# Patient Record
Sex: MALE | Race: WHITE
[De-identification: names, ages, dates, MRNs, and addresses within clinical notes are randomized per-mention and may not be internally consistent; named-entity substitution may affect disease eponyms.]

---

## 2017-01-06 ENCOUNTER — HOSPITAL ENCOUNTER (OUTPATIENT)
Dept: HOSPITAL 19 - COL.RAD | Age: 82
End: 2017-01-06
Attending: PSYCHIATRY & NEUROLOGY
Payer: MEDICARE

## 2017-01-06 DIAGNOSIS — G31.89: Primary | ICD-10-CM

## 2017-01-06 DIAGNOSIS — R41.3: ICD-10-CM

## 2017-01-06 DIAGNOSIS — I67.82: ICD-10-CM

## 2017-10-03 ENCOUNTER — HOSPITAL ENCOUNTER (OUTPATIENT)
Dept: HOSPITAL 19 - COL.RAD | Age: 82
End: 2017-10-03
Attending: PSYCHIATRY & NEUROLOGY
Payer: MEDICARE

## 2017-10-03 DIAGNOSIS — K44.9: Primary | ICD-10-CM

## 2019-07-19 ENCOUNTER — HOSPITAL ENCOUNTER (INPATIENT)
Dept: HOSPITAL 19 - COL.ER | Age: 84
LOS: 2 days | Discharge: NURSING, REHAB OR LONG-TERM CARE | DRG: 872 | End: 2019-07-21
Attending: FAMILY MEDICINE | Admitting: FAMILY MEDICINE
Payer: MEDICARE

## 2019-07-19 VITALS — HEIGHT: 70 IN | WEIGHT: 166.45 LBS | BODY MASS INDEX: 23.83 KG/M2

## 2019-07-19 VITALS — HEART RATE: 81 BPM | DIASTOLIC BLOOD PRESSURE: 46 MMHG | SYSTOLIC BLOOD PRESSURE: 125 MMHG | TEMPERATURE: 98.9 F

## 2019-07-19 VITALS — DIASTOLIC BLOOD PRESSURE: 57 MMHG | HEART RATE: 93 BPM | TEMPERATURE: 101.1 F | SYSTOLIC BLOOD PRESSURE: 143 MMHG

## 2019-07-19 VITALS — TEMPERATURE: 98.9 F | HEART RATE: 81 BPM | SYSTOLIC BLOOD PRESSURE: 125 MMHG | DIASTOLIC BLOOD PRESSURE: 46 MMHG

## 2019-07-19 DIAGNOSIS — F03.90: ICD-10-CM

## 2019-07-19 DIAGNOSIS — A41.9: Primary | ICD-10-CM

## 2019-07-19 DIAGNOSIS — R65.20: ICD-10-CM

## 2019-07-19 DIAGNOSIS — E78.5: ICD-10-CM

## 2019-07-19 DIAGNOSIS — G47.33: ICD-10-CM

## 2019-07-19 DIAGNOSIS — N39.0: ICD-10-CM

## 2019-07-19 DIAGNOSIS — R09.02: ICD-10-CM

## 2019-07-19 DIAGNOSIS — I10: ICD-10-CM

## 2019-07-19 DIAGNOSIS — E87.2: ICD-10-CM

## 2019-07-19 DIAGNOSIS — Z79.82: ICD-10-CM

## 2019-07-19 DIAGNOSIS — N40.0: ICD-10-CM

## 2019-07-19 DIAGNOSIS — D68.51: ICD-10-CM

## 2019-07-19 DIAGNOSIS — Z86.73: ICD-10-CM

## 2019-07-19 DIAGNOSIS — Z96.642: ICD-10-CM

## 2019-07-19 LAB
ALBUMIN SERPL-MCNC: 3.9 GM/DL (ref 3.5–5)
ALP SERPL-CCNC: 92 U/L (ref 50–136)
ALT SERPL-CCNC: 15 U/L (ref 21–72)
ANION GAP SERPL CALC-SCNC: 12 MMOL/L (ref 7–16)
AST SERPL-CCNC: 25 U/L (ref 15–37)
BASE EXCESS BLDA CALC-SCNC: 2.5 MMOL/L (ref -2–2)
BASOPHILS # BLD: 0.1 10*3/UL (ref 0–0.2)
BASOPHILS NFR BLD AUTO: 0.3 % (ref 0–2)
BILIRUB SERPL-MCNC: 0.9 MG/DL (ref 0–1)
BUN SERPL-MCNC: 19 MG/DL (ref 9–20)
CALCIUM SERPL-MCNC: 8.9 MG/DL (ref 8.4–10.2)
CHLORIDE SERPL-SCNC: 99 MMOL/L (ref 98–107)
CO2 BLDA-SCNC: 27.5 MMOL/L
CO2 SERPL-SCNC: 28 MMOL/L (ref 22–30)
CREAT SERPL-SCNC: 1.18 UMOL/L (ref 0.66–1.25)
EOSINOPHIL # BLD: 0 10*3/UL (ref 0–0.7)
EOSINOPHIL NFR BLD: 0 % (ref 0–4)
ERYTHROCYTE [DISTWIDTH] IN BLOOD BY AUTOMATED COUNT: 13 % (ref 11.5–14.5)
GLUCOSE SERPL-MCNC: 145 MG/DL (ref 74–106)
GRANULOCYTES # BLD AUTO: 90.3 % (ref 42.2–75.2)
HCO3 BLDA-SCNC: 26.4 MEQ/L (ref 22–26)
HCT VFR BLD AUTO: 31.4 % (ref 42–52)
HGB BLD-MCNC: 10 G/DL (ref 13.5–18)
INR BLD: 1.2 (ref 0.8–3)
LYMPHOCYTES # BLD: 1.4 10*3/UL (ref 1.2–3.4)
LYMPHOCYTES NFR BLD: 4.2 % (ref 20–51)
MCH RBC QN AUTO: 31 PG (ref 27–31)
MCHC RBC AUTO-ENTMCNC: 32 G/DL (ref 33–37)
MCV RBC AUTO: 98 FL (ref 80–100)
MONOCYTES # BLD: 1.4 10*3/UL (ref 0.1–0.6)
MONOCYTES NFR BLD AUTO: 4.3 % (ref 1.7–9.3)
NEUTROPHILS # BLD: 28.7 10*3/UL (ref 1.4–6.5)
PCO2 BLDA: 38 MMHG (ref 35–45)
PH UR STRIP.AUTO: 5 [PH] (ref 5–8)
PLATELET # BLD AUTO: 375 K/MM3 (ref 130–400)
PMV BLD AUTO: 11.1 FL (ref 7.4–10.4)
PO2 BLDA: 64.3 MMHG (ref 80–100)
POTASSIUM SERPL-SCNC: 4.2 MMOL/L (ref 3.4–5)
PROT SERPL-MCNC: 7.5 GM/DL (ref 6.4–8.2)
PROTHROMBIN TIME: 13.7 SECONDS (ref 9.7–12.8)
RBC # BLD AUTO: 3.22 M/MM3 (ref 4.2–5.6)
RBC # UR STRIP.AUTO: (no result) /UL
RBC # UR: >50 /HPF
SALICYLATES SERPL-MCNC: < 1 MG/DL
SAO2 % BLDA: 92.6 % (ref 92–100)
SODIUM SERPL-SCNC: 138 MMOL/L (ref 137–145)
SP GR UR STRIP.AUTO: 1.02 (ref 1–1.03)
SQUAMOUS # URNS: (no result) /HPF
TROPONIN I SERPL-MCNC: < 0.012 NG/ML (ref 0–0.04)
UA DIPSTICK PNL UR STRIP.AUTO: (no result)
URINE LEUKOCYTE ESTERASE: (no result)
URN COLLECT METHOD CLASS: (no result)
UROBILINOGEN UR STRIP.AUTO-MCNC: 2 MG/DL

## 2019-07-19 PROCEDURE — A4216 STERILE WATER/SALINE, 10 ML: HCPCS

## 2019-07-19 NOTE — NUR
PT ARRIVED FROM  ON [place] AND SON AT SIDE. DID 5 PAGE WITH SON. SON
ADVISED THAT FATHER HAD HIP REPLACEMENT ON JULY 2ND, WHEN AT REHAB AT [place],
THEY DID PERIOTIC BLOOD TEST AND HIS WBC WAS ELEVATED AND THEN IT DROPPED
DOWN. PT CURRENTLY IN [facility] FOR REHAB, BUT LIVES AT HOME WITH WIFE. PT
HAS SHORT TERM MEMORY PROBLEMS, DEMENTIA. PT USES WALKER TO WALK AND HAS BEEN
USING A WHEELCHAIR. NO C/O PAIN OR DISCOMFORT, CALL LIGHT WITHIN REACH. SON
TYREL ADVISED THAT TO CALL HIM FIRST AND SISTER SECOND, BUT SHE LIVES HERE. SO
TYREL LIVES IN [place], WILL RETURN TO  ON 7/20/19. CALL LIGHT WITHIN
REACH.

## 2019-07-19 NOTE — NUR
PT HAS INCISION TO RIGHT HIP THAT IS CLEAN, DRY, AND INTACT, WITH NO S/S OF
INFECTION, NO WARMTH, REDNESS, OR PAIN.

## 2019-07-20 VITALS — TEMPERATURE: 98 F | SYSTOLIC BLOOD PRESSURE: 126 MMHG | HEART RATE: 79 BPM | DIASTOLIC BLOOD PRESSURE: 54 MMHG

## 2019-07-20 VITALS — HEART RATE: 66 BPM | SYSTOLIC BLOOD PRESSURE: 127 MMHG | DIASTOLIC BLOOD PRESSURE: 57 MMHG | TEMPERATURE: 97.9 F

## 2019-07-20 VITALS — TEMPERATURE: 98.4 F | DIASTOLIC BLOOD PRESSURE: 57 MMHG | HEART RATE: 81 BPM | SYSTOLIC BLOOD PRESSURE: 131 MMHG

## 2019-07-20 VITALS — SYSTOLIC BLOOD PRESSURE: 133 MMHG | HEART RATE: 90 BPM | TEMPERATURE: 98.6 F | DIASTOLIC BLOOD PRESSURE: 45 MMHG

## 2019-07-20 VITALS — DIASTOLIC BLOOD PRESSURE: 62 MMHG | SYSTOLIC BLOOD PRESSURE: 154 MMHG | HEART RATE: 94 BPM | TEMPERATURE: 100 F

## 2019-07-20 LAB
ALBUMIN SERPL-MCNC: 3 GM/DL (ref 3.5–5)
ALP SERPL-CCNC: 81 U/L (ref 50–136)
ALT SERPL-CCNC: 18 U/L (ref 21–72)
ANION GAP SERPL CALC-SCNC: 10 MMOL/L (ref 7–16)
ANISOCYTOSIS BLD QL: (no result)
AST SERPL-CCNC: 18 U/L (ref 15–37)
BILIRUB SERPL-MCNC: 0.7 MG/DL (ref 0–1)
BUN SERPL-MCNC: 18 MG/DL (ref 9–20)
CALCIUM SERPL-MCNC: 7.9 MG/DL (ref 8.4–10.2)
CHLORIDE SERPL-SCNC: 105 MMOL/L (ref 98–107)
CO2 SERPL-SCNC: 25 MMOL/L (ref 22–30)
CREAT SERPL-SCNC: 0.84 UMOL/L (ref 0.66–1.25)
ERYTHROCYTE [DISTWIDTH] IN BLOOD BY AUTOMATED COUNT: 13.1 % (ref 11.5–14.5)
GLUCOSE SERPL-MCNC: 124 MG/DL (ref 74–106)
HCT VFR BLD AUTO: 26.4 % (ref 42–52)
HGB BLD-MCNC: 8.4 G/DL (ref 13.5–18)
HYPOCHROMIA BLD QL SMEAR: (no result)
LYMPHOCYTES NFR BLD MANUAL: 6 % (ref 20–51)
MCH RBC QN AUTO: 31 PG (ref 27–31)
MCHC RBC AUTO-ENTMCNC: 32 G/DL (ref 33–37)
MCV RBC AUTO: 98 FL (ref 80–100)
MONOCYTES NFR BLD: 3 % (ref 1.7–9.3)
NEUTS BAND NFR BLD: 4 % (ref 0–10)
NEUTS SEG NFR BLD MANUAL: 87 % (ref 42–75.2)
PLATELET # BLD AUTO: 275 K/MM3 (ref 130–400)
PLATELET BLD QL SMEAR: NORMAL
PMV BLD AUTO: 10.6 FL (ref 7.4–10.4)
POTASSIUM SERPL-SCNC: 4 MMOL/L (ref 3.4–5)
PROT SERPL-MCNC: 6.3 GM/DL (ref 6.4–8.2)
RBC # BLD AUTO: 2.7 M/MM3 (ref 4.2–5.6)
SODIUM SERPL-SCNC: 139 MMOL/L (ref 137–145)
TOXIC GRANULES BLD QL SMEAR: PRESENT

## 2019-07-20 NOTE — NUR
Patient assisted to bedside commode x2 assist and assisted to bed. Assessment
complete. Lungs clear. Murmur heard with auscultation of heart sounds. Bowels
active x4. Pulses strong throughout. No edema noted. Denies pain at this time.
Incontinent of urine and bowel. Cares provided. Denies other needs. NS at
150ml/hr infusing into right forearm without complications. Left AC INT
without complications. Patient alert but confused on plac, time, and
situation. Orientated patient. Bed alarm in place.

## 2019-07-20 NOTE — NUR
Pt has been resting in his room, spent several hours in the recliner today,
has been up and using the bedside commode with assistance with some
incontinence, VS have remained stable.

## 2019-07-20 NOTE — NUR
PT IN BED WITH HOB AT 15 DEGREE ANGLE, AND PT WEARING CPAP. PT HAS BEEN
INCONTINENT OF URINE AND HAS BEEN CHANGED A COUPLE OF TIMES. PT A/O TO SELF.
PT IS PLEASANT AND COOPERATIVE. PT DENIES PAIN OR DISCOMFORT. PT NOT ABLE TO
MOVE LEFT LEG, BUT PT DID HAVE HIP SURGERY THAT HE IS RECOVERING FROM AND
STILL HAS A DRSG OVER LEFT HIP SITE THAT IS C/D/I, AND THERE IS NO S/S OF
INFECTION NOTED. PT DID EAT A SNACK BEFORE CPAP PLACED ON HIM. PT REQUESTED
STRAWBERRY ICE CREAM. PT RESTING WELL AND DOES EASILY AWAKEN AND GOES BACK TO
SLEEP. CALL LIGHT IN REACH AND BED ALARM ON. PT WAS MOVED TO ROOM 313 TO KEEP
HIM CLOSE TO NURSES STATION.

## 2019-07-20 NOTE — NUR
Pt napping upon entry, easily awakened, no C/O pain at this time, shift
assessments complete, left Pt call light in reach, bed in lowesyt position.

## 2019-07-21 VITALS — SYSTOLIC BLOOD PRESSURE: 124 MMHG | DIASTOLIC BLOOD PRESSURE: 63 MMHG | HEART RATE: 76 BPM | TEMPERATURE: 99.1 F

## 2019-07-21 VITALS — TEMPERATURE: 98.9 F | HEART RATE: 66 BPM | DIASTOLIC BLOOD PRESSURE: 63 MMHG | SYSTOLIC BLOOD PRESSURE: 145 MMHG

## 2019-07-21 VITALS — SYSTOLIC BLOOD PRESSURE: 118 MMHG | TEMPERATURE: 98.6 F | HEART RATE: 94 BPM | DIASTOLIC BLOOD PRESSURE: 62 MMHG

## 2019-07-21 VITALS — DIASTOLIC BLOOD PRESSURE: 64 MMHG | HEART RATE: 77 BPM | TEMPERATURE: 98.8 F | SYSTOLIC BLOOD PRESSURE: 144 MMHG

## 2019-07-21 VITALS — TEMPERATURE: 98.6 F | HEART RATE: 94 BPM | DIASTOLIC BLOOD PRESSURE: 62 MMHG | SYSTOLIC BLOOD PRESSURE: 118 MMHG

## 2019-07-21 LAB
ALBUMIN SERPL-MCNC: 2.9 GM/DL (ref 3.5–5)
ALP SERPL-CCNC: 79 U/L (ref 50–136)
ALT SERPL-CCNC: 20 U/L (ref 21–72)
ANION GAP SERPL CALC-SCNC: 9 MMOL/L (ref 7–16)
AST SERPL-CCNC: 25 U/L (ref 15–37)
BASOPHILS # BLD: 0 10*3/UL (ref 0–0.2)
BASOPHILS NFR BLD AUTO: 0.3 % (ref 0–2)
BILIRUB SERPL-MCNC: 0.6 MG/DL (ref 0–1)
BUN SERPL-MCNC: 16 MG/DL (ref 9–20)
CALCIUM SERPL-MCNC: 8.2 MG/DL (ref 8.4–10.2)
CHLORIDE SERPL-SCNC: 106 MMOL/L (ref 98–107)
CO2 SERPL-SCNC: 23 MMOL/L (ref 22–30)
CREAT SERPL-SCNC: 0.78 UMOL/L (ref 0.66–1.25)
EOSINOPHIL # BLD: 0.1 10*3/UL (ref 0–0.7)
EOSINOPHIL NFR BLD: 0.8 % (ref 0–4)
ERYTHROCYTE [DISTWIDTH] IN BLOOD BY AUTOMATED COUNT: 13.1 % (ref 11.5–14.5)
GLUCOSE SERPL-MCNC: 108 MG/DL (ref 74–106)
GRANULOCYTES # BLD AUTO: 86 % (ref 42.2–75.2)
HCT VFR BLD AUTO: 32.3 % (ref 42–52)
HGB BLD-MCNC: 10.3 G/DL (ref 13.5–18)
LYMPHOCYTES # BLD: 0.8 10*3/UL (ref 1.2–3.4)
LYMPHOCYTES NFR BLD: 7 % (ref 20–51)
MCH RBC QN AUTO: 31 PG (ref 27–31)
MCHC RBC AUTO-ENTMCNC: 32 G/DL (ref 33–37)
MCV RBC AUTO: 97 FL (ref 80–100)
MONOCYTES # BLD: 0.6 10*3/UL (ref 0.1–0.6)
MONOCYTES NFR BLD AUTO: 5.5 % (ref 1.7–9.3)
NEUTROPHILS # BLD: 9.8 10*3/UL (ref 1.4–6.5)
PLATELET # BLD AUTO: 200 K/MM3 (ref 130–400)
PMV BLD AUTO: 11 FL (ref 7.4–10.4)
POTASSIUM SERPL-SCNC: 3.9 MMOL/L (ref 3.4–5)
PROT SERPL-MCNC: 6.1 GM/DL (ref 6.4–8.2)
RBC # BLD AUTO: 3.32 M/MM3 (ref 4.2–5.6)
SODIUM SERPL-SCNC: 138 MMOL/L (ref 137–145)

## 2019-07-21 NOTE — NUR
Pt napping upon entry, easily awakened, no C/O pain at this time, shift
assessments complete, left Pt call light in reach, bed in lowest position, bed
alarm on.

## 2019-07-21 NOTE — NUR
Pt napping upon entry, easily awakened, no C/O pain at this time, audible
wheezing noted, shift assessments complete, left Pt call light in reach, bed
in lowest position.

## 2019-07-21 NOTE — NUR
Patient confused at beginning of night. Incontinent throughout night. Cares
provided each time. Otherwise uneventful night. Resting in bed this AM
watching television.

## 2019-07-21 NOTE — NUR
Plan: Plans to return to [facility] for SNF.
Assess: SW met with patient in room, wife, and DTR Ml Bashir [phone].
Patient reports DPOA is Wife Kaylie [phone], Daughter Ml,a nd Son [phone]
[phone].Patient indicated that they recieve medications from [facility], pcp
is Dr. Francois. Patient reports that use of walker 24/7. Patient indicated
that his family is his care support at home.
Action: SW facilitated transfer to [facility]. Transport set at 02:30 pm. Faxed DC to
[facility]. Nothing follows.

## 2022-01-09 ENCOUNTER — HOSPITAL ENCOUNTER (INPATIENT)
Dept: HOSPITAL 19 - COL.ER | Age: 87
LOS: 4 days | Discharge: NURSING, REHAB OR LONG-TERM CARE | DRG: 689 | End: 2022-01-13
Attending: INTERNAL MEDICINE | Admitting: INTERNAL MEDICINE
Payer: MEDICARE

## 2022-01-09 VITALS — WEIGHT: 172.62 LBS | BODY MASS INDEX: 24.71 KG/M2 | HEIGHT: 70 IN

## 2022-01-09 VITALS — DIASTOLIC BLOOD PRESSURE: 72 MMHG | HEART RATE: 69 BPM | TEMPERATURE: 98.8 F | SYSTOLIC BLOOD PRESSURE: 181 MMHG

## 2022-01-09 VITALS — TEMPERATURE: 99.3 F | DIASTOLIC BLOOD PRESSURE: 47 MMHG | SYSTOLIC BLOOD PRESSURE: 116 MMHG | HEART RATE: 86 BPM

## 2022-01-09 DIAGNOSIS — B96.89: ICD-10-CM

## 2022-01-09 DIAGNOSIS — D72.829: ICD-10-CM

## 2022-01-09 DIAGNOSIS — I10: ICD-10-CM

## 2022-01-09 DIAGNOSIS — Z23: ICD-10-CM

## 2022-01-09 DIAGNOSIS — N40.0: ICD-10-CM

## 2022-01-09 DIAGNOSIS — Z79.82: ICD-10-CM

## 2022-01-09 DIAGNOSIS — E87.0: ICD-10-CM

## 2022-01-09 DIAGNOSIS — Z86.73: ICD-10-CM

## 2022-01-09 DIAGNOSIS — G47.33: ICD-10-CM

## 2022-01-09 DIAGNOSIS — F03.90: ICD-10-CM

## 2022-01-09 DIAGNOSIS — Z66: ICD-10-CM

## 2022-01-09 DIAGNOSIS — E87.6: ICD-10-CM

## 2022-01-09 DIAGNOSIS — D64.9: ICD-10-CM

## 2022-01-09 DIAGNOSIS — R65.10: ICD-10-CM

## 2022-01-09 DIAGNOSIS — Z96.642: ICD-10-CM

## 2022-01-09 DIAGNOSIS — E78.5: ICD-10-CM

## 2022-01-09 DIAGNOSIS — N39.0: Primary | ICD-10-CM

## 2022-01-09 DIAGNOSIS — J18.9: ICD-10-CM

## 2022-01-09 DIAGNOSIS — D68.51: ICD-10-CM

## 2022-01-09 DIAGNOSIS — Z20.822: ICD-10-CM

## 2022-01-09 DIAGNOSIS — F32.A: ICD-10-CM

## 2022-01-09 LAB
ALBUMIN SERPL-MCNC: 3.2 GM/DL (ref 3.4–4.8)
ALP SERPL-CCNC: 88 U/L (ref 40–150)
ALT SERPL-CCNC: 42 U/L (ref 0–55)
ANION GAP SERPL CALC-SCNC: 12 MMOL/L (ref 7–16)
AST SERPL-CCNC: 43 U/L (ref 5–34)
BASOPHILS # BLD: 0.1 K/MM3 (ref 0–0.2)
BASOPHILS NFR BLD AUTO: 0.4 % (ref 0–2)
BILIRUB SERPL-MCNC: 0.7 MG/DL (ref 0.2–1.2)
BUN SERPL-MCNC: 24 MG/DL (ref 8–26)
CALCIUM SERPL-MCNC: 9.5 MG/DL (ref 8.4–10.2)
CHLORIDE SERPL-SCNC: 119 MMOL/L (ref 98–107)
CO2 SERPL-SCNC: 26 MMOL/L (ref 23–31)
CREAT SERPL-SCNC: 1.13 MG/DL (ref 0.72–1.25)
EOSINOPHIL # BLD: 0.1 K/MM3 (ref 0–0.7)
EOSINOPHIL NFR BLD: 0.5 % (ref 0–4)
ERYTHROCYTE [DISTWIDTH] IN BLOOD BY AUTOMATED COUNT: 13.2 % (ref 11.5–14.5)
GLUCOSE SERPL-MCNC: 120 MG/DL (ref 70–99)
GRANULOCYTES # BLD AUTO: 82.5 % (ref 42.2–75.2)
HCT VFR BLD AUTO: 36.6 % (ref 42–52)
HGB BLD-MCNC: 11.7 G/DL (ref 13.5–18)
LYMPHOCYTES # BLD: 1.8 K/MM3 (ref 1.2–3.4)
LYMPHOCYTES NFR BLD: 9 % (ref 20–51)
MCH RBC QN AUTO: 31 PG (ref 27–31)
MCHC RBC AUTO-ENTMCNC: 32 G/DL (ref 33–37)
MCV RBC AUTO: 96 FL (ref 80–100)
MONOCYTES # BLD: 1.4 K/MM3 (ref 0.1–0.6)
MONOCYTES NFR BLD AUTO: 7.1 % (ref 1.7–9.3)
NEUTROPHILS # BLD: 16.3 K/MM3 (ref 1.4–6.5)
PH UR STRIP.AUTO: 5 [PH] (ref 5–8)
PLATELET # BLD AUTO: 256 K/MM3 (ref 130–400)
PMV BLD AUTO: 11.9 FL (ref 7.4–10.4)
POTASSIUM SERPL-SCNC: 3.7 MMOL/L (ref 3.5–4.5)
PROT SERPL-MCNC: 7.7 GM/DL (ref 6.2–8.1)
RBC # BLD AUTO: 3.82 M/MM3 (ref 4.2–5.6)
RBC # UR STRIP.AUTO: (no result) /UL
RBC # UR: (no result) /HPF (ref 0–2)
SODIUM SERPL-SCNC: 157 MMOL/L (ref 136–145)
SP GR UR STRIP.AUTO: 1.02 (ref 1–1.03)
UA DIPSTICK PNL UR STRIP.AUTO: (no result)
URN COLLECT METHOD CLASS: (no result)
UROBILINOGEN UR STRIP.AUTO-MCNC: 2 MG/DL

## 2022-01-10 VITALS — HEART RATE: 70 BPM | DIASTOLIC BLOOD PRESSURE: 66 MMHG | SYSTOLIC BLOOD PRESSURE: 155 MMHG

## 2022-01-10 VITALS — TEMPERATURE: 98.4 F | DIASTOLIC BLOOD PRESSURE: 64 MMHG | HEART RATE: 62 BPM | SYSTOLIC BLOOD PRESSURE: 164 MMHG

## 2022-01-10 VITALS — HEART RATE: 69 BPM | DIASTOLIC BLOOD PRESSURE: 78 MMHG | SYSTOLIC BLOOD PRESSURE: 171 MMHG | TEMPERATURE: 98 F

## 2022-01-10 VITALS — TEMPERATURE: 97.6 F | DIASTOLIC BLOOD PRESSURE: 69 MMHG | HEART RATE: 64 BPM | SYSTOLIC BLOOD PRESSURE: 170 MMHG

## 2022-01-10 VITALS — SYSTOLIC BLOOD PRESSURE: 126 MMHG | DIASTOLIC BLOOD PRESSURE: 89 MMHG | HEART RATE: 65 BPM | TEMPERATURE: 97.8 F

## 2022-01-10 VITALS — SYSTOLIC BLOOD PRESSURE: 176 MMHG | TEMPERATURE: 97.8 F | HEART RATE: 64 BPM | DIASTOLIC BLOOD PRESSURE: 73 MMHG

## 2022-01-10 LAB
ANION GAP SERPL CALC-SCNC: 11 MMOL/L (ref 7–16)
ANION GAP SERPL CALC-SCNC: 8 MMOL/L (ref 7–16)
ANION GAP SERPL CALC-SCNC: 8 MMOL/L (ref 7–16)
BASOPHILS # BLD: 0.1 K/MM3 (ref 0–0.2)
BASOPHILS NFR BLD AUTO: 0.5 % (ref 0–2)
BUN SERPL-MCNC: 19 MG/DL (ref 8–26)
BUN SERPL-MCNC: 20 MG/DL (ref 8–26)
BUN SERPL-MCNC: 25 MG/DL (ref 8–26)
CALCIUM SERPL-MCNC: 8.3 MG/DL (ref 8.4–10.2)
CALCIUM SERPL-MCNC: 8.5 MG/DL (ref 8.4–10.2)
CALCIUM SERPL-MCNC: 8.5 MG/DL (ref 8.4–10.2)
CHLORIDE SERPL-SCNC: 113 MMOL/L (ref 98–107)
CHLORIDE SERPL-SCNC: 117 MMOL/L (ref 98–107)
CHLORIDE SERPL-SCNC: 118 MMOL/L (ref 98–107)
CO2 SERPL-SCNC: 24 MMOL/L (ref 23–31)
CO2 SERPL-SCNC: 26 MMOL/L (ref 23–31)
CO2 SERPL-SCNC: 27 MMOL/L (ref 23–31)
CREAT SERPL-SCNC: 0.84 MG/DL (ref 0.72–1.25)
CREAT SERPL-SCNC: 0.85 MG/DL (ref 0.72–1.25)
CREAT SERPL-SCNC: 0.86 MG/DL (ref 0.72–1.25)
EOSINOPHIL # BLD: 0.2 K/MM3 (ref 0–0.7)
EOSINOPHIL NFR BLD: 1.2 % (ref 0–4)
ERYTHROCYTE [DISTWIDTH] IN BLOOD BY AUTOMATED COUNT: 13.2 % (ref 11.5–14.5)
GLUCOSE SERPL-MCNC: 120 MG/DL (ref 70–99)
GLUCOSE SERPL-MCNC: 131 MG/DL (ref 70–99)
GLUCOSE SERPL-MCNC: 131 MG/DL (ref 70–99)
GRANULOCYTES # BLD AUTO: 83.7 % (ref 42.2–75.2)
HCT VFR BLD AUTO: 32.7 % (ref 42–52)
HGB BLD-MCNC: 10.3 G/DL (ref 13.5–18)
LYMPHOCYTES # BLD: 1.4 K/MM3 (ref 1.2–3.4)
LYMPHOCYTES NFR BLD: 8.3 % (ref 20–51)
MAGNESIUM SERPL-MCNC: 2.3 MG/DL (ref 1.6–2.6)
MCH RBC QN AUTO: 30 PG (ref 27–31)
MCHC RBC AUTO-ENTMCNC: 32 G/DL (ref 33–37)
MCV RBC AUTO: 96 FL (ref 80–100)
MONOCYTES # BLD: 0.9 K/MM3 (ref 0.1–0.6)
MONOCYTES NFR BLD AUTO: 5.8 % (ref 1.7–9.3)
NEUTROPHILS # BLD: 13.7 K/MM3 (ref 1.4–6.5)
PLATELET # BLD AUTO: 223 K/MM3 (ref 130–400)
PMV BLD AUTO: 12.5 FL (ref 7.4–10.4)
POTASSIUM SERPL-SCNC: 3.3 MMOL/L (ref 3.5–4.5)
POTASSIUM SERPL-SCNC: 3.4 MMOL/L (ref 3.5–4.5)
POTASSIUM SERPL-SCNC: 3.7 MMOL/L (ref 3.5–4.5)
RBC # BLD AUTO: 3.41 M/MM3 (ref 4.2–5.6)
SODIUM SERPL-SCNC: 148 MMOL/L (ref 136–145)
SODIUM SERPL-SCNC: 151 MMOL/L (ref 136–145)
SODIUM SERPL-SCNC: 153 MMOL/L (ref 136–145)

## 2022-01-10 NOTE — NUR
Patient had an ok day. Scheduled medications given. Shift assessment
performed. BP elevated, medication administered. All other vital signs stable.
Patient Alert but not oriented. Mechanical soft diet initiated by ST. No s/s
of pain or discomfort at this time. Call light in reach. Fall percautions in
place.

## 2022-01-10 NOTE — NUR
The patient has dementia. JANICE contacted the patient's wife, Kaylie
(ph#[phone]), to discuss discharge plan. Kaylie resides at [facility]. The
patient resides at [facility] in [facility]. His PCP is Dr. Lv Francois. The patient does not have a DPOA-HC in EMR, but Kaylie reports
that the patient probably does have a DPOA-HC. The patient's son, Keyon
(ph#[phone]), is also listed as a person to contact. Kaylie states that
Keyon lives in [place] and that the plan is for the patient to return back
to [facility] upon discharge. JANICE contacted and faxed updates to Madonna at [facility] and
requested a copy of the patient's DPOA-HC. JANICE also attempted to contact Sangeetha
with Dr. Francois's office to inquire if they have a copy. JANICE left her a
voicemail. SW to continue to follow.
 
*Discharge plan: [facility]*

## 2022-01-10 NOTE — NUR
PT ARRIVED TO MEDICAL UNIT AROUND 2100HRS TO . PT HAS DEMENTIA AND IS
MOSTLY NONVERBAL. PT A&O TO SELF. VSS, O2 RA. ADMISSIONS ASSESSMENT AS
COMPLETE AS I CAN, DUE TO PT'S DEMENTIA AND VERBAL ABILITIES. I DID SPEAK WITH
PT'S SON AND THE NIGHT RN AT [facility] TO GET WHAT ANSWERS I COULD FROM THEM.
PT SEEMED TO BE COMFORTABLE. PT UNABLE TO ANSWER QUESTIONS CONCERNING N/V,
SOB, CHEST PAIN, ETC. ATTEMPTED TO ORIENT PT TO ROOM AND CALL LIGHT. I AM
UNABLE TO TELL IF HE UNDERSTANDS. WILL CONTINUE TO LOOK IN ON PT. CALL LIGHT
WITHIN REACH

## 2022-01-10 NOTE — NUR
SW received the patient's DPOA-HC, via fax. SW placed the document in the
patient's chart. The patient's DPOA-HC is his wife. The alternates are Joni Azar and Ml Bashir.

## 2022-01-11 VITALS — DIASTOLIC BLOOD PRESSURE: 68 MMHG | SYSTOLIC BLOOD PRESSURE: 125 MMHG | TEMPERATURE: 98.2 F | HEART RATE: 74 BPM

## 2022-01-11 VITALS — TEMPERATURE: 98.6 F | DIASTOLIC BLOOD PRESSURE: 48 MMHG | HEART RATE: 70 BPM | SYSTOLIC BLOOD PRESSURE: 178 MMHG

## 2022-01-11 VITALS — DIASTOLIC BLOOD PRESSURE: 81 MMHG | SYSTOLIC BLOOD PRESSURE: 146 MMHG | HEART RATE: 78 BPM | TEMPERATURE: 98.8 F

## 2022-01-11 VITALS — TEMPERATURE: 99 F | SYSTOLIC BLOOD PRESSURE: 129 MMHG | HEART RATE: 76 BPM | DIASTOLIC BLOOD PRESSURE: 57 MMHG

## 2022-01-11 VITALS — HEART RATE: 70 BPM | TEMPERATURE: 99.5 F | DIASTOLIC BLOOD PRESSURE: 69 MMHG | SYSTOLIC BLOOD PRESSURE: 147 MMHG

## 2022-01-11 VITALS — HEART RATE: 75 BPM | DIASTOLIC BLOOD PRESSURE: 74 MMHG | TEMPERATURE: 98.9 F | SYSTOLIC BLOOD PRESSURE: 161 MMHG

## 2022-01-11 LAB
ANION GAP SERPL CALC-SCNC: 11 MMOL/L (ref 7–16)
ANION GAP SERPL CALC-SCNC: 12 MMOL/L (ref 7–16)
BASOPHILS # BLD: 0.1 K/MM3 (ref 0–0.2)
BASOPHILS NFR BLD AUTO: 0.5 % (ref 0–2)
BUN SERPL-MCNC: 18 MG/DL (ref 8–26)
BUN SERPL-MCNC: 19 MG/DL (ref 8–26)
CALCIUM SERPL-MCNC: 8.4 MG/DL (ref 8.4–10.2)
CALCIUM SERPL-MCNC: 8.4 MG/DL (ref 8.4–10.2)
CHLORIDE SERPL-SCNC: 111 MMOL/L (ref 98–107)
CHLORIDE SERPL-SCNC: 113 MMOL/L (ref 98–107)
CO2 SERPL-SCNC: 22 MMOL/L (ref 23–31)
CO2 SERPL-SCNC: 23 MMOL/L (ref 23–31)
CREAT SERPL-SCNC: 0.85 MG/DL (ref 0.72–1.25)
CREAT SERPL-SCNC: 0.95 MG/DL (ref 0.72–1.25)
EOSINOPHIL # BLD: 0.3 K/MM3 (ref 0–0.7)
EOSINOPHIL NFR BLD: 2.2 % (ref 0–4)
ERYTHROCYTE [DISTWIDTH] IN BLOOD BY AUTOMATED COUNT: 13.2 % (ref 11.5–14.5)
GLUCOSE SERPL-MCNC: 118 MG/DL (ref 70–99)
GLUCOSE SERPL-MCNC: 124 MG/DL (ref 70–99)
GRANULOCYTES # BLD AUTO: 73.9 % (ref 42.2–75.2)
HCT VFR BLD AUTO: 32.9 % (ref 42–52)
HGB BLD-MCNC: 10.1 G/DL (ref 13.5–18)
LYMPHOCYTES # BLD: 2.3 K/MM3 (ref 1.2–3.4)
LYMPHOCYTES NFR BLD: 17.5 % (ref 20–51)
MCH RBC QN AUTO: 31 PG (ref 27–31)
MCHC RBC AUTO-ENTMCNC: 31 G/DL (ref 33–37)
MCV RBC AUTO: 100 FL (ref 80–100)
MONOCYTES # BLD: 0.7 K/MM3 (ref 0.1–0.6)
MONOCYTES NFR BLD AUTO: 5.4 % (ref 1.7–9.3)
NEUTROPHILS # BLD: 9.8 K/MM3 (ref 1.4–6.5)
PLATELET # BLD AUTO: 229 K/MM3 (ref 130–400)
PMV BLD AUTO: 13 FL (ref 7.4–10.4)
POTASSIUM SERPL-SCNC: 3.3 MMOL/L (ref 3.5–4.5)
POTASSIUM SERPL-SCNC: 3.4 MMOL/L (ref 3.5–4.5)
RBC # BLD AUTO: 3.3 M/MM3 (ref 4.2–5.6)
SODIUM SERPL-SCNC: 145 MMOL/L (ref 136–145)
SODIUM SERPL-SCNC: 147 MMOL/L (ref 136–145)

## 2022-01-11 NOTE — NUR
Patient is sitting in bed, alert but not oriented. Has been presenting HTN.
PRN provided. Assessment completed, medications provided. Able to take
medications PO with thick liquids. No further needs at this time. Call light
within reach. Bed alarm on.

## 2022-01-11 NOTE — NUR
ASSESSMENT COMPLETE FOR THIS SHIFT. PT RESTING IN BED WATCHING TV. PT'S
DAUGHTER CALL AND TALKED TO PT. ALTHOUGH, PT IS MOSTLY NONVERBAL, PT (I
BELIEVE OUT OF HABIT), SAID TO HIS DAUGHTER, "HOW'S MY FAVORITE DAUGHTER!"
AT THE BEGINNING OF THE CALL AND "I LOVE YOU, TOO!" AT THE END OF THE CALL.
HE DIDN'T REALLY SAY ANYTHING IN THE MIDDLE OF THE CALL. I WILL CONTINUE TO
CHECK OF PT OFTEN TO ENSURE HIS NEEDS ARE MET. CALL LIGHT WITHIN REACH.

## 2022-01-12 VITALS — HEART RATE: 69 BPM | TEMPERATURE: 98.4 F | SYSTOLIC BLOOD PRESSURE: 147 MMHG | DIASTOLIC BLOOD PRESSURE: 60 MMHG

## 2022-01-12 VITALS — DIASTOLIC BLOOD PRESSURE: 66 MMHG | HEART RATE: 76 BPM | TEMPERATURE: 98.4 F | SYSTOLIC BLOOD PRESSURE: 141 MMHG

## 2022-01-12 VITALS — SYSTOLIC BLOOD PRESSURE: 136 MMHG | HEART RATE: 66 BPM | TEMPERATURE: 98.4 F | DIASTOLIC BLOOD PRESSURE: 96 MMHG

## 2022-01-12 VITALS — HEART RATE: 75 BPM | DIASTOLIC BLOOD PRESSURE: 70 MMHG | SYSTOLIC BLOOD PRESSURE: 147 MMHG | TEMPERATURE: 99.1 F

## 2022-01-12 VITALS — HEART RATE: 68 BPM | SYSTOLIC BLOOD PRESSURE: 127 MMHG | TEMPERATURE: 98 F | DIASTOLIC BLOOD PRESSURE: 60 MMHG

## 2022-01-12 LAB
ANION GAP SERPL CALC-SCNC: 11 MMOL/L (ref 7–16)
BASOPHILS # BLD: 0.1 K/MM3 (ref 0–0.2)
BASOPHILS NFR BLD AUTO: 0.5 % (ref 0–2)
BUN SERPL-MCNC: 17 MG/DL (ref 8–26)
CALCIUM SERPL-MCNC: 8.5 MG/DL (ref 8.4–10.2)
CHLORIDE SERPL-SCNC: 112 MMOL/L (ref 98–107)
CO2 SERPL-SCNC: 23 MMOL/L (ref 23–31)
CREAT SERPL-SCNC: 0.87 MG/DL (ref 0.72–1.25)
EOSINOPHIL # BLD: 0.3 K/MM3 (ref 0–0.7)
EOSINOPHIL NFR BLD: 2.9 % (ref 0–4)
ERYTHROCYTE [DISTWIDTH] IN BLOOD BY AUTOMATED COUNT: 13 % (ref 11.5–14.5)
GLUCOSE SERPL-MCNC: 106 MG/DL (ref 70–99)
GRANULOCYTES # BLD AUTO: 70.4 % (ref 42.2–75.2)
HCT VFR BLD AUTO: 32.8 % (ref 42–52)
HGB BLD-MCNC: 10.2 G/DL (ref 13.5–18)
LYMPHOCYTES # BLD: 2.4 K/MM3 (ref 1.2–3.4)
LYMPHOCYTES NFR BLD: 20.4 % (ref 20–51)
MCH RBC QN AUTO: 30 PG (ref 27–31)
MCHC RBC AUTO-ENTMCNC: 31 G/DL (ref 33–37)
MCV RBC AUTO: 97 FL (ref 80–100)
MONOCYTES # BLD: 0.6 K/MM3 (ref 0.1–0.6)
MONOCYTES NFR BLD AUTO: 5.2 % (ref 1.7–9.3)
NEUTROPHILS # BLD: 8.4 K/MM3 (ref 1.4–6.5)
PLATELET # BLD AUTO: 245 K/MM3 (ref 130–400)
PMV BLD AUTO: 12.8 FL (ref 7.4–10.4)
POTASSIUM SERPL-SCNC: 3.4 MMOL/L (ref 3.5–4.5)
RBC # BLD AUTO: 3.38 M/MM3 (ref 4.2–5.6)
SODIUM SERPL-SCNC: 146 MMOL/L (ref 136–145)

## 2022-01-12 NOTE — NUR
Patient is resting in bed, alert but not oriented. He answered with his hands
that he is doing ok. It is difficult to give the medications. He closes his
mouth and refuses to take water. In the end he took them. Assessment
completed. No further needs at this time. Call light within reach, bed alarm
on.

## 2022-01-12 NOTE — NUR
PT has had a calm night. Episodes of HBP. One dose of hydralazine given. All
needs met. Report will be given to shimon GODINEZ.

## 2022-01-12 NOTE — NUR
The patient may be able to discharge tomorrow, 1/12. JANICE notified and faxed
updates to Madonna at [facility]. JANICE contacted and updated the patient's wife, Kaylie.
Pat is in agreement to the plan.
 
*Discharge plan: [facility]*

## 2022-01-12 NOTE — NUR
Shift assessment complete. Pt alert, not oriented. Minimal verbal response.
Coughing with sips of water. Hospitalist notified and re-evaluation by ST
ordered. Lungs CTA. Heart RRR. Bed alarm on and call light in reach.

## 2022-01-13 VITALS — TEMPERATURE: 97.7 F | SYSTOLIC BLOOD PRESSURE: 136 MMHG | DIASTOLIC BLOOD PRESSURE: 64 MMHG | HEART RATE: 59 BPM

## 2022-01-13 VITALS — HEART RATE: 68 BPM | DIASTOLIC BLOOD PRESSURE: 70 MMHG | SYSTOLIC BLOOD PRESSURE: 144 MMHG | TEMPERATURE: 98.3 F

## 2022-01-13 VITALS — HEART RATE: 60 BPM | SYSTOLIC BLOOD PRESSURE: 152 MMHG | DIASTOLIC BLOOD PRESSURE: 68 MMHG | TEMPERATURE: 96.9 F

## 2022-01-13 VITALS — DIASTOLIC BLOOD PRESSURE: 70 MMHG | HEART RATE: 61 BPM | SYSTOLIC BLOOD PRESSURE: 153 MMHG | TEMPERATURE: 98 F

## 2022-01-13 LAB
ANION GAP SERPL CALC-SCNC: 11 MMOL/L (ref 7–16)
BASOPHILS # BLD: 0.1 K/MM3 (ref 0–0.2)
BASOPHILS NFR BLD AUTO: 0.6 % (ref 0–2)
BUN SERPL-MCNC: 19 MG/DL (ref 8–26)
CALCIUM SERPL-MCNC: 8.5 MG/DL (ref 8.4–10.2)
CHLORIDE SERPL-SCNC: 112 MMOL/L (ref 98–107)
CO2 SERPL-SCNC: 23 MMOL/L (ref 23–31)
CREAT SERPL-SCNC: 0.85 MG/DL (ref 0.72–1.25)
EOSINOPHIL # BLD: 0.4 K/MM3 (ref 0–0.7)
EOSINOPHIL NFR BLD: 2.9 % (ref 0–4)
ERYTHROCYTE [DISTWIDTH] IN BLOOD BY AUTOMATED COUNT: 13 % (ref 11.5–14.5)
GLUCOSE SERPL-MCNC: 94 MG/DL (ref 70–99)
GRANULOCYTES # BLD AUTO: 71.5 % (ref 42.2–75.2)
HCT VFR BLD AUTO: 32.3 % (ref 42–52)
HGB BLD-MCNC: 10.5 G/DL (ref 13.5–18)
LYMPHOCYTES # BLD: 2.3 K/MM3 (ref 1.2–3.4)
LYMPHOCYTES NFR BLD: 18.9 % (ref 20–51)
MCH RBC QN AUTO: 31 PG (ref 27–31)
MCHC RBC AUTO-ENTMCNC: 33 G/DL (ref 33–37)
MCV RBC AUTO: 95 FL (ref 80–100)
MONOCYTES # BLD: 0.7 K/MM3 (ref 0.1–0.6)
MONOCYTES NFR BLD AUTO: 5.4 % (ref 1.7–9.3)
NEUTROPHILS # BLD: 8.6 K/MM3 (ref 1.4–6.5)
PLATELET # BLD AUTO: 252 K/MM3 (ref 130–400)
PMV BLD AUTO: 12.6 FL (ref 7.4–10.4)
POTASSIUM SERPL-SCNC: 3.3 MMOL/L (ref 3.5–4.5)
RBC # BLD AUTO: 3.4 M/MM3 (ref 4.2–5.6)
SODIUM SERPL-SCNC: 146 MMOL/L (ref 136–145)

## 2022-01-13 NOTE — NUR
Patient is alert but not oriented, has dementia. This RN assiste the patient
with breakfast. Patient takes small bites and small sips, follows ques to
swallow. Talks very little, q2h turn schedule.

## 2022-01-13 NOTE — NUR
A palliative care consult was ordered. Diana, Palliative Care RN, notified JANICE
that the patient's family is considering comfort care, but they would prefer
for the patient to return back to his same room at [facility] for now and they will
continue hospice conversations with [facility]. JANICE notified Madonna at [facility].
 
The patient is to discharge today, 1/13, back to [facility] for
long-term care. Transportation was scheduled at 1430, via [facility]. JANICE informed the
patient's RN and his wife, Kaylie, of the time. JANICE also presented and read the IM
form outloud to the patient's wife, over the phone. The patient's wife
verbalized understanding and gave JANICE approval to sign the form on her behalf.
No additional needs at this time.

## 2022-01-13 NOTE — NUR
Had long discussion with patient's son Keyon. He is a little hesitant about the
word/designation of "hospice" but understand that his dad is progressing in
his disease. Keyon plans to have discussions with his family and [facility] about
"hospice" and his dad's care moving forward. He is also on his way here to
visit and I will touch base with him when he arrives.

## 2022-01-13 NOTE — NUR
Patient has had a calm night. He has had hypertension but not higher than 170.
Pt denies to drink water. Report will be given to day RN.

## 2022-01-13 NOTE — NUR
Call made to patient's wife, Pat #[phone]. She is aware of the patient's
dementia and that he is nearing the end-of-life in his disease. She would like
him to return back to [facility] and is open to discussing hospice services with
them. She also requested that I call her son and discuss things with him,
including her decision about hospice.
Call made to Keyon, patient's son #[phone], left [facility]. Notified
primary RN and SW of discussion with wife.

## 2022-01-13 NOTE — NUR
Patient discharged back to [facility]. Son was present at the time of
discharge. IV discontinued by this RN. Patient was changed before leaving,
calrita-care completed.

## 2023-03-08 NOTE — NUR
Patient has had an ok day. Scheduled medications given. Shift assessment
performed. Patient Alert, but not oriented. PO fluid intake encouraged. Duque
catheter DC'd, balloon intact, clarita care performed. Patient's family and
primary care nurse updated. BP elevated on last check. Seth Carrasco notified,
BP medication orders placed. Patient is currently resting in bed. No s/s of
pain, discomfort, or further need at this time. Call light in reach. Fall
percautions in place. c/s